# Patient Record
(demographics unavailable — no encounter records)

---

## 2025-04-25 NOTE — PHYSICAL EXAM
[Normal Appearance] : normal appearance [Well Groomed] : well groomed [General Appearance - In No Acute Distress] : no acute distress [Respiration, Rhythm And Depth] : normal respiratory rhythm and effort [Exaggerated Use Of Accessory Muscles For Inspiration] : no accessory muscle use [Normal Station and Gait] : the gait and station were normal for the patient's age [] : no rash [No Focal Deficits] : no focal deficits [Oriented To Time, Place, And Person] : oriented to person, place, and time [Affect] : the affect was normal [Mood] : the mood was normal [Urethral Meatus] : meatus normal [Penis Abnormality] : normal circumcised penis [Scrotum] : the scrotum was normal [Testes Tenderness] : no tenderness of the testes [Testes Mass (___cm)] : there were no testicular masses [Prostate Tenderness] : the prostate was not tender [No Prostate Nodules] : no prostate nodules [Prostate Size ___ gm] : prostate size [unfilled] gm [de-identified] : B/L 20cc testes, no masses. Large, rubbery, smooth prostate

## 2025-04-25 NOTE — HISTORY OF PRESENT ILLNESS
[FreeTextEntry1] : PATRICIO MOSELEY is a 60 year M presenting on 04/25/2025 PMH: elevated PSA, peyronies, HTN, DM  1.5 yr F/U  Checkup  No urinary bother. No  meds Nocturia when drinking water before bed No hematuria  No ED  Peyronies: Approved for [employer] 10/2023 but did not pursue Has since improved  Daily bike riding No FHX cancer No tobacco  PSA  3.0, 11/2023, 19% free 4.36, 10/2023 2.78, 9/2022

## 2025-04-25 NOTE — END OF VISIT
[FreeTextEntry3] : I, Dr. Elias, personally performed the evaluation and management (E/M) services for this established patient who presents today with (a) new problem(s)/exacerbation of (an) existing condition(s). That E/M includes conducting the clinically appropriate interval history &/or exam, assessing all new/exacerbated conditions, and establishing a new plan of care. Today, my ELDER, AisleFinderclara Johnsonins, was here to observe my evaluation and management service for this new problem/exacerbated condition and follow the plan of care established by me going forward